# Patient Record
Sex: FEMALE | Race: BLACK OR AFRICAN AMERICAN | Employment: FULL TIME | URBAN - METROPOLITAN AREA
[De-identification: names, ages, dates, MRNs, and addresses within clinical notes are randomized per-mention and may not be internally consistent; named-entity substitution may affect disease eponyms.]

---

## 2022-07-01 ENCOUNTER — OFFICE VISIT (OUTPATIENT)
Dept: OTOLARYNGOLOGY | Facility: CLINIC | Age: 52
End: 2022-07-01
Payer: COMMERCIAL

## 2022-07-01 VITALS — BODY MASS INDEX: 27.7 KG/M2 | WEIGHT: 187 LBS | HEIGHT: 69 IN

## 2022-07-01 DIAGNOSIS — R09.82 POSTNASAL DRIP: ICD-10-CM

## 2022-07-01 DIAGNOSIS — R49.0 HOARSENESS: ICD-10-CM

## 2022-07-01 DIAGNOSIS — K21.9 GASTROESOPHAGEAL REFLUX DISEASE, UNSPECIFIED WHETHER ESOPHAGITIS PRESENT: Primary | ICD-10-CM

## 2022-07-01 PROCEDURE — 3008F BODY MASS INDEX DOCD: CPT | Performed by: SPECIALIST

## 2022-07-01 PROCEDURE — 99203 OFFICE O/P NEW LOW 30 MIN: CPT | Performed by: SPECIALIST

## 2022-07-01 RX ORDER — TEMAZEPAM 15 MG/1
CAPSULE ORAL
COMMUNITY
Start: 2022-05-09

## 2022-07-01 RX ORDER — OMEPRAZOLE 40 MG/1
40 CAPSULE, DELAYED RELEASE ORAL DAILY
Qty: 30 CAPSULE | Refills: 5 | Status: SHIPPED | OUTPATIENT
Start: 2022-07-01

## 2022-07-01 NOTE — PATIENT INSTRUCTIONS
There is no evidence of sinusitis on the day to your exam.  You did have posterior laryngeal erythema. I placed on a trial of omeprazole. This is an antireflux diet for you. No greasy foods, spicy foods, chocolate, caffeine, alcohol, spearmint, peppermint, lemon, lime, orange, grapefruit, tomatoes. Don't eat 2 hours before bedtime  Elevate the head of bed  Follow-up in 3 to 4 weeks time, sooner if problems.

## 2022-07-29 ENCOUNTER — OFFICE VISIT (OUTPATIENT)
Dept: OTOLARYNGOLOGY | Facility: CLINIC | Age: 52
End: 2022-07-29
Payer: COMMERCIAL

## 2022-07-29 VITALS — HEIGHT: 69 IN | WEIGHT: 187 LBS | BODY MASS INDEX: 27.7 KG/M2

## 2022-07-29 DIAGNOSIS — K21.9 GASTROESOPHAGEAL REFLUX DISEASE, UNSPECIFIED WHETHER ESOPHAGITIS PRESENT: Primary | ICD-10-CM

## 2022-07-29 PROCEDURE — 3008F BODY MASS INDEX DOCD: CPT | Performed by: SPECIALIST

## 2022-07-29 PROCEDURE — 99213 OFFICE O/P EST LOW 20 MIN: CPT | Performed by: SPECIALIST

## 2022-07-29 RX ORDER — FAMOTIDINE 40 MG/1
40 TABLET, FILM COATED ORAL DAILY
Qty: 30 TABLET | Refills: 2 | Status: SHIPPED | OUTPATIENT
Start: 2022-07-29

## 2022-07-29 RX ORDER — OMEPRAZOLE 40 MG/1
40 CAPSULE, DELAYED RELEASE ORAL DAILY
Qty: 30 CAPSULE | Refills: 2 | Status: SHIPPED | OUTPATIENT
Start: 2022-07-29

## 2022-07-29 NOTE — PATIENT INSTRUCTIONS
Persistent throat symptoms including burning. I asked her to continue the omeprazole and added famotidine at bedtime. This is an antireflux diet. No greasy foods, spicy foods, chocolate, caffeine, alcohol, spearmint, peppermint, lemon, lime, orange, grapefruit, tomatoes. Don't eat 2 hours before bedtime  Elevate the head of bed  Up in 4 weeks time, sooner if problems.